# Patient Record
Sex: FEMALE | Race: WHITE | HISPANIC OR LATINO | Employment: FULL TIME | URBAN - NONMETROPOLITAN AREA
[De-identification: names, ages, dates, MRNs, and addresses within clinical notes are randomized per-mention and may not be internally consistent; named-entity substitution may affect disease eponyms.]

---

## 2024-08-20 ENCOUNTER — HOSPITAL ENCOUNTER (OUTPATIENT)
Dept: RADIOLOGY | Facility: HOSPITAL | Age: 44
Discharge: HOME | End: 2024-08-20
Attending: NURSE PRACTITIONER
Payer: COMMERCIAL

## 2024-08-20 DIAGNOSIS — E03.9 HYPOTHYROIDISM, UNSPECIFIED: ICD-10-CM

## 2024-08-20 PROCEDURE — 76536 US EXAM OF HEAD AND NECK: CPT | Mod: TC

## 2025-03-05 ENCOUNTER — OFFICE VISIT (OUTPATIENT)
Dept: OBSTETRICS AND GYNECOLOGY | Facility: CLINIC | Age: 45
End: 2025-03-05
Payer: COMMERCIAL

## 2025-03-05 ENCOUNTER — TELEPHONE (OUTPATIENT)
Dept: OBSTETRICS AND GYNECOLOGY | Facility: CLINIC | Age: 45
End: 2025-03-05

## 2025-03-05 VITALS
SYSTOLIC BLOOD PRESSURE: 118 MMHG | DIASTOLIC BLOOD PRESSURE: 71 MMHG | HEIGHT: 65 IN | BODY MASS INDEX: 36.65 KG/M2 | WEIGHT: 220 LBS | HEART RATE: 79 BPM

## 2025-03-05 DIAGNOSIS — E66.9 OBESITY (BMI 30-39.9): ICD-10-CM

## 2025-03-05 DIAGNOSIS — N92.1 MENORRHAGIA WITH IRREGULAR CYCLE: Primary | ICD-10-CM

## 2025-03-05 DIAGNOSIS — Z98.51 TUBAL LIGATION STATUS: ICD-10-CM

## 2025-03-05 DIAGNOSIS — N95.1 MENOPAUSAL SYMPTOMS: ICD-10-CM

## 2025-03-05 DIAGNOSIS — E03.9 HYPOTHYROIDISM, UNSPECIFIED TYPE: ICD-10-CM

## 2025-03-05 DIAGNOSIS — R93.89 THICKENED ENDOMETRIUM: ICD-10-CM

## 2025-03-05 PROCEDURE — 3008F BODY MASS INDEX DOCD: CPT | Mod: CPTII,S$GLB,, | Performed by: OBSTETRICS & GYNECOLOGY

## 2025-03-05 PROCEDURE — 99203 OFFICE O/P NEW LOW 30 MIN: CPT | Mod: S$GLB,,, | Performed by: OBSTETRICS & GYNECOLOGY

## 2025-03-05 PROCEDURE — 3074F SYST BP LT 130 MM HG: CPT | Mod: CPTII,S$GLB,, | Performed by: OBSTETRICS & GYNECOLOGY

## 2025-03-05 PROCEDURE — 1159F MED LIST DOCD IN RCRD: CPT | Mod: CPTII,S$GLB,, | Performed by: OBSTETRICS & GYNECOLOGY

## 2025-03-05 PROCEDURE — 1160F RVW MEDS BY RX/DR IN RCRD: CPT | Mod: CPTII,S$GLB,, | Performed by: OBSTETRICS & GYNECOLOGY

## 2025-03-05 PROCEDURE — 99999 PR PBB SHADOW E&M-EST. PATIENT-LVL III: CPT | Mod: PBBFAC,,, | Performed by: OBSTETRICS & GYNECOLOGY

## 2025-03-05 PROCEDURE — 3078F DIAST BP <80 MM HG: CPT | Mod: CPTII,S$GLB,, | Performed by: OBSTETRICS & GYNECOLOGY

## 2025-03-05 RX ORDER — BUPROPION HYDROCHLORIDE 300 MG/1
300 TABLET ORAL EVERY MORNING
COMMUNITY
Start: 2024-11-06

## 2025-03-05 RX ORDER — LINACLOTIDE 145 UG/1
145 CAPSULE, GELATIN COATED ORAL EVERY MORNING
COMMUNITY
Start: 2024-12-07

## 2025-03-05 RX ORDER — SERDEXMETHYLPHENIDATE AND DEXMETHYLPHENIDATE 7.8; 39.2 MG/1; MG/1
1 CAPSULE ORAL
COMMUNITY
Start: 2025-02-12

## 2025-03-05 NOTE — PROGRESS NOTES
Subjective:    Patient ID: Luis Gonzalez is a 44 y.o. y.o. female    Chief Complaint:   Chief Complaint   Patient presents with    Vaginal Bleeding     Patient states that she has been experiencing irregular cycles for the last year. States that they have gotten much heavier over this time and has 2-3 cycles some months.        History of Present Illness:  Luis presents today for evaluation of abnormal bleeding.  Patient states that she has always had regular cycles.  She reports that in December 2023 she had 2 cycles that month.  Since then she states her cycles have gotten much heavier and on occasion she will have 2 or 3 cycles in some months.  She does state that she is now passing clots with her cycles      Review of Systems   Constitutional:  Negative for chills and fever.   Respiratory:  Negative for shortness of breath.    Cardiovascular:  Negative for chest pain.   Gastrointestinal:  Negative for constipation and diarrhea.   Genitourinary:  Positive for menorrhagia and menstrual problem. Negative for dysmenorrhea, dysuria and pelvic pain.   Neurological:  Negative for headaches.         Objective:    Vital Signs:  Vitals:    03/05/25 1105   BP: 118/71   Pulse: 79     Wt Readings from Last 1 Encounters:   03/05/25 99.8 kg (220 lb)     Body mass index is 36.61 kg/m².    Physical Exam:  General:  alert, no distress   Skin:  Skin color, texture, turgor normal. No rashes or lesions   Abdomen:  Soft, nontender   Extremities: No cyanosis, clubbing, edema         Ultrasound:  Endometrial lining 1.1 cm-thickened.  Bilateral ovaries within normal limits     Discussed ultrasound findings and explained that her lining should not be this thick since she just finished her menstrual cycle.  Discussed possible causes such as polyps as well as hormonal changes.  Recommend endometrial biopsy which we will perform at next visit.  She states she is also due for a Pap so we will obtain Pap at the same visit.   Treatment options also discussed.  All questions answered    I spent a total of 30 minutes on the day of the visit.  This includes face to face time and non-face to face time preparing to see the patient (eg, review of tests), obtaining and/or reviewing separately obtained history, documenting clinical information in the electronic or other health record, independently interpreting results and communicating results to the patient/family/caregiver, or care coordinator.           Assessment:      1. Menorrhagia with irregular cycle    2. Tubal ligation status    3. Thickened endometrium    4. Obesity (BMI 30-39.9)    5. Hypothyroidism, unspecified type    6. Menopausal symptoms          Plan:      Menorrhagia with irregular cycle  -     CBC Auto Differential; Future; Expected date: 03/05/2025  -     Comprehensive Metabolic Panel; Future; Expected date: 03/05/2025  -     Prolactin; Future; Expected date: 03/05/2025  -     Estradiol; Future; Expected date: 03/05/2025  -     TSH; Future; Expected date: 03/05/2025  -     Progesterone; Future; Expected date: 03/05/2025  -     Follicle Stimulating Hormone; Future; Expected date: 03/05/2025  -     Luteinizing Hormone; Future; Expected date: 03/05/2025  -     Insulin, Random; Future; Expected date: 03/05/2025  -     Testosterone; Future; Expected date: 03/05/2025  -     Testosterone, Free; Future; Expected date: 03/05/2025  -     Hemoglobin A1C; Future; Expected date: 03/05/2025    Tubal ligation status    Thickened endometrium    Obesity (BMI 30-39.9)  -     CBC Auto Differential; Future; Expected date: 03/05/2025  -     Comprehensive Metabolic Panel; Future; Expected date: 03/05/2025  -     Prolactin; Future; Expected date: 03/05/2025  -     Estradiol; Future; Expected date: 03/05/2025  -     TSH; Future; Expected date: 03/05/2025  -     Progesterone; Future; Expected date: 03/05/2025  -     Follicle Stimulating Hormone; Future; Expected date: 03/05/2025  -     Luteinizing  Hormone; Future; Expected date: 03/05/2025  -     Insulin, Random; Future; Expected date: 03/05/2025  -     Testosterone; Future; Expected date: 03/05/2025  -     Testosterone, Free; Future; Expected date: 03/05/2025  -     Hemoglobin A1C; Future; Expected date: 03/05/2025    Hypothyroidism, unspecified type  -     CBC Auto Differential; Future; Expected date: 03/05/2025  -     Comprehensive Metabolic Panel; Future; Expected date: 03/05/2025  -     Prolactin; Future; Expected date: 03/05/2025  -     Estradiol; Future; Expected date: 03/05/2025  -     TSH; Future; Expected date: 03/05/2025  -     Progesterone; Future; Expected date: 03/05/2025  -     Follicle Stimulating Hormone; Future; Expected date: 03/05/2025  -     Luteinizing Hormone; Future; Expected date: 03/05/2025  -     Insulin, Random; Future; Expected date: 03/05/2025  -     Testosterone; Future; Expected date: 03/05/2025  -     Testosterone, Free; Future; Expected date: 03/05/2025  -     Hemoglobin A1C; Future; Expected date: 03/05/2025    Menopausal symptoms  -     CBC Auto Differential; Future; Expected date: 03/05/2025  -     Comprehensive Metabolic Panel; Future; Expected date: 03/05/2025  -     Prolactin; Future; Expected date: 03/05/2025  -     Estradiol; Future; Expected date: 03/05/2025  -     TSH; Future; Expected date: 03/05/2025  -     Progesterone; Future; Expected date: 03/05/2025  -     Follicle Stimulating Hormone; Future; Expected date: 03/05/2025  -     Luteinizing Hormone; Future; Expected date: 03/05/2025  -     Insulin, Random; Future; Expected date: 03/05/2025  -     Testosterone; Future; Expected date: 03/05/2025  -     Testosterone, Free; Future; Expected date: 03/05/2025  -     Hemoglobin A1C; Future; Expected date: 03/05/2025    RTC EMBX       Ene Augustine MD, FACOG   03/05/2025 11:21 AM

## 2025-03-27 ENCOUNTER — PROCEDURE VISIT (OUTPATIENT)
Dept: OBSTETRICS AND GYNECOLOGY | Facility: CLINIC | Age: 45
End: 2025-03-27
Payer: COMMERCIAL

## 2025-03-27 VITALS
SYSTOLIC BLOOD PRESSURE: 112 MMHG | HEIGHT: 65 IN | WEIGHT: 223 LBS | DIASTOLIC BLOOD PRESSURE: 57 MMHG | BODY MASS INDEX: 37.15 KG/M2 | HEART RATE: 82 BPM

## 2025-03-27 DIAGNOSIS — R93.89 THICKENED ENDOMETRIUM: Primary | ICD-10-CM

## 2025-03-27 DIAGNOSIS — L74.510 HYPERHIDROSIS OF AXILLA: ICD-10-CM

## 2025-03-27 DIAGNOSIS — Z98.51 TUBAL LIGATION STATUS: ICD-10-CM

## 2025-03-27 DIAGNOSIS — N92.1 MENORRHAGIA WITH IRREGULAR CYCLE: ICD-10-CM

## 2025-03-27 DIAGNOSIS — Z12.4 SCREENING FOR CERVICAL CANCER: ICD-10-CM

## 2025-03-27 NOTE — PROCEDURES
Endometrial biopsy    Date/Time: 3/27/2025 3:45 PM    Performed by: Ene Augustine MD  Authorized by: Ene Augustine MD    Consent:     Consent given by:  Patient (verbal)    Patient questions answered: yes      Patient agrees, verbalizes understanding, and wants to proceed: yes      Educational handouts given: yes      Instructions and paperwork completed: yes    Indication:     Indications: Menorrhagia    Pre-procedure:     Pre-procedure timeout performed: yes    Procedure:     Procedure: endometrial biopsy with Pipelle      Cervix cleaned and prepped: yes (Betadine)      A paracervical block was performed: no      The cervix was dilated using cervical dilators: no      Use of single-tooth tenaculum: yes (Tenaculum site hemostatic with silver nitrate)      Uterus sounded: yes      Uterus sound depth (cm):  10    Specimen collected: specimen collected and sent to pathology      Patient tolerated procedure well with no complications: yes

## 2025-03-27 NOTE — PROGRESS NOTES
Subjective:    Patient ID: Luis Gonzalez is a 44 y.o. y.o. female    Chief Complaint:   Chief Complaint   Patient presents with    embx       History of Present Illness:  Luis presents today for  endometrial biopsy for menorrhagia.  We will also collect her Pap today as she was on her menses at her last visit.    Patient complaining of excessive sweating and odor.  She states that she has used different clinical to strength antiperspirant as well as the newer whole body deodorants and has had no success in regard to her armpits.  She reports no major issues for her groin area      Review of Systems   Constitutional:  Negative for chills and fever.   Respiratory:  Negative for shortness of breath.    Cardiovascular:  Negative for chest pain.   Gastrointestinal:  Negative for constipation.   Genitourinary:  Positive for dysmenorrhea, menorrhagia and menstrual problem.   Neurological:  Negative for headaches.         Objective:    Vital Signs:  Vitals:    03/27/25 1613   BP: (!) 112/57   Pulse: 82       Body mass index is 37.11 kg/m².    Physical Exam:  General:  alert, no distress   Skin:  Skin color, texture, turgor normal. No rashes or lesions   Pelvis: External genitalia: normal general appearance  Urinary system: urethral meatus normal, bladder nontender  Vaginal: normal mucosa without prolapse or lesions  Cervix: normal appearance, thin prep PAP obtained  Uterus: normal single, nontender  Adnexa: normal bimanual exam   Extremities: No cyanosis, clubbing, edema.        Endometrial biopsy obtained-see procedure note.  Discussed options regarding her excessive sweating and she states that primary care has prescribed some special pads which have not arrived yet.  I explained that if they do not help she may have to consider Botox treatments to the axillae.  All questions answered        Assessment:      1. Thickened endometrium    2. Screening for cervical cancer    3. Hyperhidrosis of axilla          Plan:       Thickened endometrium  -     Specimen to Pathology Gynecology and Obstetrics    Screening for cervical cancer  -     Liquid-Based Pap Smear, Screening    Hyperhidrosis of axilla           Ene Augustine MD, FACOG   03/27/2025 4:29 PM

## 2025-04-07 ENCOUNTER — TELEPHONE (OUTPATIENT)
Dept: OBSTETRICS AND GYNECOLOGY | Facility: CLINIC | Age: 45
End: 2025-04-07
Payer: COMMERCIAL

## 2025-04-07 NOTE — TELEPHONE ENCOUNTER
I gave pt her pap and embx results they were both normal, she can proceed with hysterectomy or ablation. Pt wants a call from the nurse to explain what's the difference between the 2

## 2025-04-11 ENCOUNTER — RESULTS FOLLOW-UP (OUTPATIENT)
Dept: OBSTETRICS AND GYNECOLOGY | Facility: CLINIC | Age: 45
End: 2025-04-11